# Patient Record
Sex: MALE | Race: WHITE
[De-identification: names, ages, dates, MRNs, and addresses within clinical notes are randomized per-mention and may not be internally consistent; named-entity substitution may affect disease eponyms.]

---

## 2017-06-26 ENCOUNTER — HOSPITAL ENCOUNTER (EMERGENCY)
Dept: HOSPITAL 17 - NEPD | Age: 52
Discharge: HOME | End: 2017-06-26
Payer: OTHER GOVERNMENT

## 2017-06-26 VITALS — WEIGHT: 222.67 LBS | HEIGHT: 72 IN | BODY MASS INDEX: 30.16 KG/M2

## 2017-06-26 VITALS
DIASTOLIC BLOOD PRESSURE: 100 MMHG | OXYGEN SATURATION: 100 % | HEART RATE: 98 BPM | SYSTOLIC BLOOD PRESSURE: 175 MMHG | RESPIRATION RATE: 24 BRPM | TEMPERATURE: 98.8 F

## 2017-06-26 DIAGNOSIS — S71.151A: ICD-10-CM

## 2017-06-26 DIAGNOSIS — S61.452A: ICD-10-CM

## 2017-06-26 DIAGNOSIS — S81.052A: Primary | ICD-10-CM
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
## 2017-06-26 DIAGNOSIS — S61.451A: ICD-10-CM
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
## 2017-06-26 PROCEDURE — 12004 RPR S/N/AX/GEN/TRK7.6-12.5CM: CPT

## 2017-06-26 NOTE — PD
HPI


Chief Complaint:  Bite or Sting


Time Seen by Provider:  11:01


Travel History


International Travel<30 days:  No


Contact w/Intl Traveler<30days:  No


Traveled to known affect area:  No





History of Present Illness


HPI


51 YO right-hand dominant male presents to the ED for evaluation of multiple 

dog bites sustained approximately 2 hours before arrival.The patient is a fence 

. He states that he approached the doorway of a home, a pit bull came 

through the dog door and began to attack him.  He states that he was knocked to 

the ground.  He states that he cleaned the wounds with alcohol and peroxide and 

the homeowner applied Neosporin ointment and bandages immediately after the 

injury. He states that the homeowner assured him that the dog is current on its 

vaccinations. He states that his tetanus immunization is up-to-date.





[facility]


Social History


Tobacco Use:  No





Allergies-Medications


(Allergen,Severity, Reaction):  


Coded Allergies:  


     No Known Allergies (Unverified , 6/26/17)


Reported Meds & Prescriptions





Reported Meds & Active Scripts


Active


Robaxin (Methocarbamol) 500 Mg Tab 1,000 Mg PO TID


Tramadol (Tramadol HCl) 50 Mg Tab 50 Mg PO Q8H PRN


Augmentin (Amoxicillin-Clavulanate) 875-125 Mg Tab 1 Tab PO BID 5 Days








Review of Systems


Except as stated in HPI:  all other systems reviewed are Neg





Physical Exam


Narrative


GENERAL: Well-nourished, well-developed patient.


SKIN: Focused skin assessment warm/dry.  There are numerous abrasions, 

superficial lacerations and ecchymosis of all extremities.  There is a 1.5 cm 

puncture/laceration in the interdigital space between the thumb and first 

finger of the right hand.  There is a subcentimeter puncture wound of the 

hypothenar eminence of the right hand.  There is a 1.5 cm laceration of the 

tuft of the thumb of the left hand.  There is a 5 cm laceration of the lateral 

aspect of the left knee.  This does not disrupt the fascia.  The compartment of 

the knee is not disrupted.  There are 5 approximate 1 cm linear abrasions and 2 

subcentimeter puncture wounds of the of the midline of the left posterior thigh

, just beneath the buttocks.  There is a 3 cm abrasion of the lateral aspect of 

the left posterior thigh with 2 subcentimeter puncture wounds.  There are 2 

subcentimeter puncture wounds of the left posterior calf.  There is a 3 x 5 cm 

abrasion of the anterior lateral right thigh.  There is a 2 cm superficial 

laceration and one 7 L puncture wound 2 of the right anterior calf.


HEAD: Normocephalic.


EYES: No scleral icterus. No injection or drainage. 


NECK: Supple, trachea midline. No JVD or lymphadenopathy.


CARDIOVASCULAR: Regular rate and rhythm without murmurs, gallops, or rubs. 


RESPIRATORY: Breath sounds equal bilaterally. No accessory muscle use.


GASTROINTESTINAL: Abdomen soft, non-tender, nondistended. 


MUSCULOSKELETAL: No cyanosis, or edema.  2+ DP pulses of the bilateral lower 

extremities.  The patient maintains full, active, painless range of motion of 

the joints of the lower extremities bilaterally.  Neurovascularly intact in the 

lower extremities bilaterally.


FOCUSED RIGHT UPPER EXTREMITY EXAM: 2+ radial pulse.  Patient maintains full, 

active, painless range of motion of the joints of the hand and the wrist.  

Strong finger to thumb opposition on each finger.  Sensation intact to light 

touch on each finger distally.  Cap refill less than 2 seconds.


FOCUSED LEFT UPPER EXTREMITY EXAM: 2+ radial pulse.  The patient maintains full

, active, painless SOILA of the joints of the hand and wrist.  There is strong 

finger to thumb opposition on each digit.  Sensation is intact to light touch 

in each digit distally, cap refill less than 2 seconds.


BACK: Nontender without obvious deformity. No CVA tenderness.





Data


Data


Last Documented VS





Vital Signs








  Date Time  Temp Pulse Resp B/P Pulse Ox O2 Delivery O2 Flow Rate FiO2


 


6/26/17 10:09 98.8 98 24 175/100 100 Room Air  








Orders





 Lidocaine 1% Inj (50 Ml) (Xylocaine 1% I (6/26/17 11:30)


Acetamin-Hydrocod 325-7.5 Mg (Norco  7.5 (6/26/17 11:30)


Amoxicil-Clavulanate (Augmentin) (6/26/17 13:00)








MDM


Medical Decision Making


Medical Screen Exam Complete:  Yes


Emergency Medical Condition:  Yes


Differential Diagnosis


Contusion versus abrasion versus laceration versus puncture wound versus animal 

bite versus other


Narrative Course


51 YO right-hand dominant male presents to the ED for evaluation of multiple 

dog bites sustained approximately 2 hours before arrival. He cleaned the wounds 

with alcohol and peroxide and the  dog's owner applied Neosporin ointment and 

bandages immediately after the injury. He states that the homeowner assured him 

that the dog is current on its vaccinations. Tetanus immunization is up-to-

date.  Vitals reviewed.  Physical exam reveals a well-appearing white male in 

no acute distress.  He does have multiple wounds of the extremities.  Including 

puncture wounds of bilateral hands, and multiple sites of the left leg.  There 

is a laceration of the lateral aspect of the left leg.  The patient's wounds 

were irrigated with a total of 7 Irrimax devices.  Multiple laceration repairs 

were performed.  Please see my procedure notes for details.  Patient was 

prescribed a course of Augmentin, first dose administered in the ED.  He was 

prescribed a short course of pain medications and muscle relaxants, provided no 

real excuse for work.  He is instructed to keep the wounds clean, dry, covered, 

return to the ED for evaluation of the wounds within 48 hours.  He was given 

wound care instructions.  He indicated understanding of the instructions and is 

agreeable to the care plan.  He is stable and discharged home.





Procedures


**Procedure Narrative**


LACERATION


LOCATION: Left anterior lateral knee


LENGTH: 5 cm


NUMBER OF STITCHES/STAPLES: 3 deep, 7 superficial





REPAIR: The area of the laceration was prepped with Betadine and sterilely 

draped.  The laceration was infiltrated with  


1% lidocaine.  The wound was copiously irrigated and explored without evidence 

of foreign body, tendon injury or neurovascular  


injury.  The wound was closed using 3-0 Vicryl and 3-0 Prolene. This was a 

single layer repair. A sterile dressing was applied. The patient was  


advised to keep the dressing clean and dry. Patient tolerated the procedure 

well.





LACERATION


LOCATION: Tuft of left thumb


LENGTH: 1.5 cm


NUMBER OF STITCHES/STAPLES: 1





REPAIR: The area of the laceration was prepped with Betadine and sterilely 

draped.  The laceration was infiltrated with  


1% lidocaine.  The wound was copiously irrigated and explored without evidence 

of foreign body, tendon injury or neurovascular  


injury.  The wound was closed using 4-0 Vicryl. This was a single layer repair. 

A sterile dressing was applied. The patient was  


advised to keep the dressing clean and dry. Patient tolerated the procedure 

well.





LACERATION


LOCATION: Interdigital space of the thumb and first finger right hand


LENGTH: 1.5 cm


NUMBER OF STITCHES/STAPLES: 2





REPAIR: The area of the laceration was prepped with Betadine and sterilely 

draped.  The laceration was infiltrated with  


1% lidocaine.  The wound was copiously irrigated and explored without evidence 

of foreign body, tendon injury or neurovascular  


injury.  The wound was closed using 4-0 Prolene. This was a single layer 

repair. A sterile dressing was applied. The patient was  


advised to keep the dressing clean and dry. Patient tolerated the procedure 

well.





Diagnosis





 Primary Impression:  


 Dog bite of multiple sites


 Additional Impressions:  


 Dog bite of right thigh


 Qualified Code:  S71.151A - Dog bite of right thigh, initial encounter


 Dog bite of left knee


 Qualified Code:  S81.052A - Dog bite of left knee, initial encounter


 Dog bite of left hand


 Qualified Code:  S61.452A - Dog bite of left hand, initial encounter


 Dog bite of right hand


 Qualified Code:  S61.451A - Dog bite of right hand, initial encounter


Referrals:  


Workers Comp


Patient Instructions:  Acute Wound Care (ED), Animal Bite (ED), Care For Your 

Stitches (ED), General Instructions





***Additional Instructions:


Rest, hydrate.


Keep your wounds clean, dry and covered.


Take antibiotics as prescribed.


Take pain medications and muscle relaxants as needed for pain and muscle spasm.


Do not drive while taking pain medications and muscle relaxants.


Return to the ED in 48 hours for wound recheck.


Monitor wounds for signs of infection as discussed, return to the ED sooner if 

these arise.


Follow-up with the Worker's Comp. department at your place of employment.


Return to ED for any urgent or emergent medical condition.


***Med/Other Pt SpecificInfo:  Prescription(s) given


Scripts


Methocarbamol (Robaxin)500 Mg Tab1,000 Mg PO TID  #10 TAB  Ref 0


   Prov:Anabela Nettles MD         6/26/17 


Tramadol 50 Mg Tab50 Mg PO Q8H PRN (PAIN) #10 TAB  Ref 0


   Prov:Anabela Nettles MD         6/26/17 


Amoxicillin-Clavulanate (Augmentin)875-125 Mg Tab1 Tab PO BID  5 Days  Ref 0


   Prov:Anabela Nettles MD         6/26/17


Disposition:  01 DISCHARGE HOME


Condition:  Stable








Eloisa Jernigan Jun 26, 2017 11:02

## 2023-08-02 ENCOUNTER — APPOINTMENT (RX ONLY)
Dept: URBAN - METROPOLITAN AREA CLINIC 77 | Facility: CLINIC | Age: 58
Setting detail: DERMATOLOGY
End: 2023-08-02

## 2023-08-02 DIAGNOSIS — L57.0 ACTINIC KERATOSIS: ICD-10-CM

## 2023-08-02 DIAGNOSIS — L663 OTHER SPECIFIED DISEASES OF HAIR AND HAIR FOLLICLES: ICD-10-CM

## 2023-08-02 DIAGNOSIS — L73.9 FOLLICULAR DISORDER, UNSPECIFIED: ICD-10-CM

## 2023-08-02 DIAGNOSIS — D18.0 HEMANGIOMA: ICD-10-CM | Status: STABLE

## 2023-08-02 DIAGNOSIS — L81.5 LEUKODERMA, NOT ELSEWHERE CLASSIFIED: ICD-10-CM

## 2023-08-02 DIAGNOSIS — D22 MELANOCYTIC NEVI: ICD-10-CM | Status: STABLE

## 2023-08-02 DIAGNOSIS — L91.8 OTHER HYPERTROPHIC DISORDERS OF THE SKIN: ICD-10-CM

## 2023-08-02 DIAGNOSIS — Z71.89 OTHER SPECIFIED COUNSELING: ICD-10-CM

## 2023-08-02 DIAGNOSIS — L81.4 OTHER MELANIN HYPERPIGMENTATION: ICD-10-CM | Status: STABLE

## 2023-08-02 DIAGNOSIS — L82.1 OTHER SEBORRHEIC KERATOSIS: ICD-10-CM

## 2023-08-02 DIAGNOSIS — L738 OTHER SPECIFIED DISEASES OF HAIR AND HAIR FOLLICLES: ICD-10-CM

## 2023-08-02 PROBLEM — D22.5 MELANOCYTIC NEVI OF TRUNK: Status: ACTIVE | Noted: 2023-08-02

## 2023-08-02 PROBLEM — L02.32 FURUNCLE OF BUTTOCK: Status: ACTIVE | Noted: 2023-08-02

## 2023-08-02 PROBLEM — D18.01 HEMANGIOMA OF SKIN AND SUBCUTANEOUS TISSUE: Status: ACTIVE | Noted: 2023-08-02

## 2023-08-02 PROCEDURE — ? FULL BODY SKIN EXAM

## 2023-08-02 PROCEDURE — ? PRESCRIPTION

## 2023-08-02 PROCEDURE — 17003 DESTRUCT PREMALG LES 2-14: CPT

## 2023-08-02 PROCEDURE — 17000 DESTRUCT PREMALG LESION: CPT

## 2023-08-02 PROCEDURE — ? RECOMMENDATIONS

## 2023-08-02 PROCEDURE — ? LIQUID NITROGEN

## 2023-08-02 PROCEDURE — 99203 OFFICE O/P NEW LOW 30 MIN: CPT | Mod: 25

## 2023-08-02 PROCEDURE — ? COUNSELING

## 2023-08-02 PROCEDURE — ? TREATMENT REGIMEN

## 2023-08-02 RX ORDER — DOXYCYCLINE HYCLATE 100 MG/1
CAPSULE, GELATIN COATED ORAL DAILY
Qty: 30 | Refills: 3 | Status: ERX | COMMUNITY
Start: 2023-08-02

## 2023-08-02 RX ADMIN — DOXYCYCLINE HYCLATE: 100 CAPSULE, GELATIN COATED ORAL at 00:00

## 2023-08-02 ASSESSMENT — LOCATION SIMPLE DESCRIPTION DERM
LOCATION SIMPLE: UPPER BACK
LOCATION SIMPLE: LEFT TEMPLE
LOCATION SIMPLE: PENIS
LOCATION SIMPLE: RIGHT POSTERIOR AXILLA
LOCATION SIMPLE: RIGHT BUTTOCK
LOCATION SIMPLE: RIGHT UPPER BACK
LOCATION SIMPLE: LEFT PRETIBIAL REGION
LOCATION SIMPLE: LEFT POSTERIOR AXILLA
LOCATION SIMPLE: RIGHT PRETIBIAL REGION
LOCATION SIMPLE: ABDOMEN
LOCATION SIMPLE: POSTERIOR NECK
LOCATION SIMPLE: LEFT BUTTOCK
LOCATION SIMPLE: LOWER BACK
LOCATION SIMPLE: RIGHT TEMPLE

## 2023-08-02 ASSESSMENT — LOCATION ZONE DERM
LOCATION ZONE: FACE
LOCATION ZONE: PENIS
LOCATION ZONE: LEG
LOCATION ZONE: TRUNK
LOCATION ZONE: NECK
LOCATION ZONE: AXILLAE

## 2023-08-02 ASSESSMENT — LOCATION DETAILED DESCRIPTION DERM
LOCATION DETAILED: LEFT DORSAL SHAFT OF PENIS
LOCATION DETAILED: EPIGASTRIC SKIN
LOCATION DETAILED: RIGHT MEDIAL UPPER BACK
LOCATION DETAILED: INFERIOR THORACIC SPINE
LOCATION DETAILED: LEFT BUTTOCK
LOCATION DETAILED: RIGHT BUTTOCK
LOCATION DETAILED: RIGHT CENTRAL TEMPLE
LOCATION DETAILED: LEFT POSTERIOR AXILLA
LOCATION DETAILED: RIGHT POSTERIOR AXILLA
LOCATION DETAILED: SUPERIOR LUMBAR SPINE
LOCATION DETAILED: RIGHT DISTAL PRETIBIAL REGION
LOCATION DETAILED: RIGHT POSTERIOR NECK
LOCATION DETAILED: LEFT CENTRAL TEMPLE
LOCATION DETAILED: LEFT DISTAL PRETIBIAL REGION

## 2023-08-02 NOTE — PROCEDURE: LIQUID NITROGEN
Show Aperture Variable?: Yes
Duration Of Freeze Thaw-Cycle (Seconds): 0
Render Post-Care Instructions In Note?: no
Application Tool (Optional): Cry-AC
Detail Level: Simple
Post-Care Instructions: I reviewed with the patient in detail post-care instructions. Patient is to wear sunprotection, and avoid picking at any of the treated lesions. Pt may apply Vaseline to crusted or scabbing areas.
Consent: The patient's consent was obtained including but not limited to risks of crusting, scabbing, blistering, scarring, darker or lighter pigmentary change, recurrence, incomplete removal and infection.
Number Of Freeze-Thaw Cycles: 1 freeze-thaw cycle

## 2023-08-02 NOTE — PROCEDURE: RECOMMENDATIONS
Recommendation Preamble: The following recommendations were made during the visit:
Detail Level: Zone
Render Risk Assessment In Note?: no
Recommendations (Free Text): To follow up with their primary care physician yearly or as indicated.\\nNo suspicious lesions noted on skin examination.
Recommendations (Free Text): Benzoyl peroxide wash